# Patient Record
Sex: FEMALE | Race: WHITE | NOT HISPANIC OR LATINO | URBAN - METROPOLITAN AREA
[De-identification: names, ages, dates, MRNs, and addresses within clinical notes are randomized per-mention and may not be internally consistent; named-entity substitution may affect disease eponyms.]

---

## 2022-04-11 ENCOUNTER — OUTPATIENT (OUTPATIENT)
Dept: OUTPATIENT SERVICES | Facility: HOSPITAL | Age: 22
LOS: 1 days | End: 2022-04-11
Payer: COMMERCIAL

## 2022-04-11 VITALS — DIASTOLIC BLOOD PRESSURE: 55 MMHG | HEART RATE: 83 BPM | SYSTOLIC BLOOD PRESSURE: 107 MMHG

## 2022-04-11 VITALS — OXYGEN SATURATION: 99 % | HEART RATE: 100 BPM

## 2022-04-11 DIAGNOSIS — O26.899 OTHER SPECIFIED PREGNANCY RELATED CONDITIONS, UNSPECIFIED TRIMESTER: ICD-10-CM

## 2022-04-11 DIAGNOSIS — Z3A.00 WEEKS OF GESTATION OF PREGNANCY NOT SPECIFIED: ICD-10-CM

## 2022-04-11 PROCEDURE — 99213 OFFICE O/P EST LOW 20 MIN: CPT

## 2022-04-11 PROCEDURE — G0463: CPT

## 2022-04-11 PROCEDURE — 59025 FETAL NON-STRESS TEST: CPT

## 2022-04-11 NOTE — OB RN TRIAGE NOTE - FALL HARM RISK - UNIVERSAL INTERVENTIONS
Bed in lowest position, wheels locked, appropriate side rails in place/Call bell, personal items and telephone in reach/Instruct patient to call for assistance before getting out of bed or chair/Non-slip footwear when patient is out of bed/[place] to call system/Physically safe environment - no spills, clutter or unnecessary equipment/Purposeful Proactive Rounding/Room/bathroom lighting operational, light cord in reach

## 2022-04-11 NOTE — OB PROVIDER TRIAGE NOTE - HISTORY OF PRESENT ILLNESS
Pt is a 20yo G1 @ 38w0d here with ROM, clear at 115a. Pt reports "gush" of fluid while sleeping. Pt. denies further episodes of leaking. Pt. denies vb, ctx. +FM PNC uncomplicated. GBS (-) EFW 2900g    OBHx: 1st preg  GYNHx: denies ovarian cysts, fibroids, hx of abnormal pap or STDs  PMHx: denies  PSurgHx: denies  Allergies: PCN, Cephalosporins: unknown  Meds: PNV  Social: No smoking, alcohol, or illicit drug use  Psych: No hx of anxiety or depression

## 2022-04-11 NOTE — OB PROVIDER TRIAGE NOTE - NSHPPHYSICALEXAM_GEN_ALL_CORE
PE:    T(C): 37.1 (04-11-22 @ 03:40), Max: 37.1 (04-11-22 @ 03:40)  HR: 90 (04-11-22 @ 04:25) (86 - 100)  BP: 117/71 (04-11-22 @ 03:40) (117/71 - 117/71)  RR: 18 (04-11-22 @ 03:40) (18 - 18)  SpO2: 98% (04-11-22 @ 04:25) (97% - 99%)    General: NAD, A&Ox3  CV: RRR  Lungs: Clear bilat   Abd: soft, nontender, gravid  SSE: -pooling, -nitrizine, -ferning  VE: 2/60/-3  Bedside sono: vertex, YANNA 12  EFM: /moderate variablity/+accels/-decels  TOCO:

## 2022-04-11 NOTE — OB PROVIDER TRIAGE NOTE - NSOBPROVIDERNOTE_OBGYN_ALL_OB_FT
A/P: 20yo G1 @ 38w0d here for ROM and found to not be ruptured  -EFM: reactive   -Pt to be discharged to home with labor precautions and reasons to return to hospital       d/w Mick Vincent PA-C

## 2022-04-14 ENCOUNTER — OUTPATIENT (OUTPATIENT)
Dept: OUTPATIENT SERVICES | Facility: HOSPITAL | Age: 22
LOS: 1 days | End: 2022-04-14
Payer: COMMERCIAL

## 2022-04-14 VITALS
OXYGEN SATURATION: 98 % | RESPIRATION RATE: 18 BRPM | HEART RATE: 87 BPM | DIASTOLIC BLOOD PRESSURE: 71 MMHG | TEMPERATURE: 98 F | SYSTOLIC BLOOD PRESSURE: 113 MMHG

## 2022-04-14 DIAGNOSIS — Z3A.00 WEEKS OF GESTATION OF PREGNANCY NOT SPECIFIED: ICD-10-CM

## 2022-04-14 DIAGNOSIS — O26.899 OTHER SPECIFIED PREGNANCY RELATED CONDITIONS, UNSPECIFIED TRIMESTER: ICD-10-CM

## 2022-04-14 LAB
APTT BLD: 30.9 SEC — SIGNIFICANT CHANGE UP (ref 27.5–35.5)
BLD GP AB SCN SERPL QL: NEGATIVE — SIGNIFICANT CHANGE UP
FIBRINOGEN PPP-MCNC: 645 MG/DL — HIGH (ref 330–520)
HCT VFR BLD CALC: 35.6 % — SIGNIFICANT CHANGE UP (ref 34.5–45)
HGB BLD-MCNC: 11.7 G/DL — SIGNIFICANT CHANGE UP (ref 11.5–15.5)
INR BLD: 0.96 RATIO — SIGNIFICANT CHANGE UP (ref 0.88–1.16)
MCHC RBC-ENTMCNC: 27.7 PG — SIGNIFICANT CHANGE UP (ref 27–34)
MCHC RBC-ENTMCNC: 32.9 GM/DL — SIGNIFICANT CHANGE UP (ref 32–36)
MCV RBC AUTO: 84.2 FL — SIGNIFICANT CHANGE UP (ref 80–100)
NRBC # BLD: 0 /100 WBCS — SIGNIFICANT CHANGE UP (ref 0–0)
PLATELET # BLD AUTO: 191 K/UL — SIGNIFICANT CHANGE UP (ref 150–400)
PROTHROM AB SERPL-ACNC: 11 SEC — SIGNIFICANT CHANGE UP (ref 10.5–13.4)
RBC # BLD: 4.23 M/UL — SIGNIFICANT CHANGE UP (ref 3.8–5.2)
RBC # FLD: 14.1 % — SIGNIFICANT CHANGE UP (ref 10.3–14.5)
RH IG SCN BLD-IMP: POSITIVE — SIGNIFICANT CHANGE UP
WBC # BLD: 10.29 K/UL — SIGNIFICANT CHANGE UP (ref 3.8–10.5)
WBC # FLD AUTO: 10.29 K/UL — SIGNIFICANT CHANGE UP (ref 3.8–10.5)

## 2022-04-14 PROCEDURE — G0463: CPT

## 2022-04-14 PROCEDURE — 86900 BLOOD TYPING SEROLOGIC ABO: CPT

## 2022-04-14 PROCEDURE — 36415 COLL VENOUS BLD VENIPUNCTURE: CPT

## 2022-04-14 PROCEDURE — 59025 FETAL NON-STRESS TEST: CPT

## 2022-04-14 PROCEDURE — 86850 RBC ANTIBODY SCREEN: CPT

## 2022-04-14 PROCEDURE — 85730 THROMBOPLASTIN TIME PARTIAL: CPT

## 2022-04-14 PROCEDURE — 85610 PROTHROMBIN TIME: CPT

## 2022-04-14 PROCEDURE — 85384 FIBRINOGEN ACTIVITY: CPT

## 2022-04-14 PROCEDURE — 85027 COMPLETE CBC AUTOMATED: CPT

## 2022-04-14 PROCEDURE — 86901 BLOOD TYPING SEROLOGIC RH(D): CPT

## 2022-04-14 RX ORDER — FAMOTIDINE 10 MG/ML
20 INJECTION INTRAVENOUS ONCE
Refills: 0 | Status: COMPLETED | OUTPATIENT
Start: 2022-04-14 | End: 2022-04-14

## 2022-04-14 RX ORDER — FAMOTIDINE 10 MG/ML
20 INJECTION INTRAVENOUS ONCE
Refills: 0 | Status: DISCONTINUED | OUTPATIENT
Start: 2022-04-14 | End: 2022-04-14

## 2022-04-14 RX ADMIN — FAMOTIDINE 20 MILLIGRAM(S): 10 INJECTION INTRAVENOUS at 23:30
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
## 2022-04-14 NOTE — OB RN TRIAGE NOTE - FALL HARM RISK - UNIVERSAL INTERVENTIONS
Bed in lowest position, wheels locked, appropriate side rails in place/Call bell, personal items and telephone in reach/Instruct patient to call for assistance before getting out of bed or chair/Non-slip footwear when patient is out of bed/[place] to call system/Physically safe environment - no spills, clutter or unnecessary equipment/Purposeful Proactive Rounding/Room/bathroom lighting operational, light cord in reach

## 2022-04-15 VITALS — OXYGEN SATURATION: 94 % | HEART RATE: 86 BPM | DIASTOLIC BLOOD PRESSURE: 73 MMHG | SYSTOLIC BLOOD PRESSURE: 119 MMHG

## 2022-04-19 ENCOUNTER — INPATIENT (INPATIENT)
Facility: HOSPITAL | Age: 22
LOS: 0 days | Discharge: HOME | End: 2022-04-20
Attending: OBSTETRICS & GYNECOLOGY | Admitting: OBSTETRICS & GYNECOLOGY
Payer: COMMERCIAL

## 2022-04-19 VITALS — HEART RATE: 113 BPM | OXYGEN SATURATION: 97 %

## 2022-04-19 DIAGNOSIS — Z3A.00 WEEKS OF GESTATION OF PREGNANCY NOT SPECIFIED: ICD-10-CM

## 2022-04-19 DIAGNOSIS — Z34.80 ENCOUNTER FOR SUPERVISION OF OTHER NORMAL PREGNANCY, UNSPECIFIED TRIMESTER: ICD-10-CM

## 2022-04-19 DIAGNOSIS — O26.899 OTHER SPECIFIED PREGNANCY RELATED CONDITIONS, UNSPECIFIED TRIMESTER: ICD-10-CM

## 2022-04-19 LAB
BASOPHILS # BLD AUTO: 0.03 K/UL — SIGNIFICANT CHANGE UP (ref 0–0.2)
BASOPHILS NFR BLD AUTO: 0.2 % — SIGNIFICANT CHANGE UP (ref 0–2)
BLD GP AB SCN SERPL QL: NEGATIVE — SIGNIFICANT CHANGE UP
COVID-19 SPIKE DOMAIN AB INTERP: POSITIVE
COVID-19 SPIKE DOMAIN ANTIBODY RESULT: >250 U/ML — HIGH
EOSINOPHIL # BLD AUTO: 0.04 K/UL — SIGNIFICANT CHANGE UP (ref 0–0.5)
EOSINOPHIL NFR BLD AUTO: 0.3 % — SIGNIFICANT CHANGE UP (ref 0–6)
HCT VFR BLD CALC: 39.7 % — SIGNIFICANT CHANGE UP (ref 34.5–45)
HGB BLD-MCNC: 12.9 G/DL — SIGNIFICANT CHANGE UP (ref 11.5–15.5)
IMM GRANULOCYTES NFR BLD AUTO: 0.5 % — SIGNIFICANT CHANGE UP (ref 0–1.5)
LYMPHOCYTES # BLD AUTO: 1.32 K/UL — SIGNIFICANT CHANGE UP (ref 1–3.3)
LYMPHOCYTES # BLD AUTO: 9 % — LOW (ref 13–44)
MCHC RBC-ENTMCNC: 26.7 PG — LOW (ref 27–34)
MCHC RBC-ENTMCNC: 32.5 GM/DL — SIGNIFICANT CHANGE UP (ref 32–36)
MCV RBC AUTO: 82.2 FL — SIGNIFICANT CHANGE UP (ref 80–100)
MONOCYTES # BLD AUTO: 0.8 K/UL — SIGNIFICANT CHANGE UP (ref 0–0.9)
MONOCYTES NFR BLD AUTO: 5.5 % — SIGNIFICANT CHANGE UP (ref 2–14)
NEUTROPHILS # BLD AUTO: 12.39 K/UL — HIGH (ref 1.8–7.4)
NEUTROPHILS NFR BLD AUTO: 84.5 % — HIGH (ref 43–77)
NRBC # BLD: 0 /100 WBCS — SIGNIFICANT CHANGE UP (ref 0–0)
PLATELET # BLD AUTO: 223 K/UL — SIGNIFICANT CHANGE UP (ref 150–400)
RBC # BLD: 4.83 M/UL — SIGNIFICANT CHANGE UP (ref 3.8–5.2)
RBC # FLD: 14.4 % — SIGNIFICANT CHANGE UP (ref 10.3–14.5)
RH IG SCN BLD-IMP: POSITIVE — SIGNIFICANT CHANGE UP
RUBV IGG SER-ACNC: 0.7 INDEX — SIGNIFICANT CHANGE UP
RUBV IGG SER-IMP: NEGATIVE — SIGNIFICANT CHANGE UP
SARS-COV-2 IGG+IGM SERPL QL IA: >250 U/ML — HIGH
SARS-COV-2 IGG+IGM SERPL QL IA: POSITIVE
SARS-COV-2 RNA SPEC QL NAA+PROBE: SIGNIFICANT CHANGE UP
T PALLIDUM AB TITR SER: NEGATIVE — SIGNIFICANT CHANGE UP
WBC # BLD: 14.65 K/UL — HIGH (ref 3.8–10.5)
WBC # FLD AUTO: 14.65 K/UL — HIGH (ref 3.8–10.5)

## 2022-04-19 RX ORDER — CITRIC ACID/SODIUM CITRATE 300-500 MG
15 SOLUTION, ORAL ORAL EVERY 6 HOURS
Refills: 0 | Status: DISCONTINUED | OUTPATIENT
Start: 2022-04-19 | End: 2022-04-19

## 2022-04-19 RX ORDER — OXYTOCIN 10 UNIT/ML
333.33 VIAL (ML) INJECTION
Qty: 20 | Refills: 0 | Status: DISCONTINUED | OUTPATIENT
Start: 2022-04-19 | End: 2022-04-20

## 2022-04-19 RX ORDER — KETOROLAC TROMETHAMINE 30 MG/ML
30 SYRINGE (ML) INJECTION ONCE
Refills: 0 | Status: DISCONTINUED | OUTPATIENT
Start: 2022-04-19 | End: 2022-04-19

## 2022-04-19 RX ORDER — MAGNESIUM HYDROXIDE 400 MG/1
30 TABLET, CHEWABLE ORAL
Refills: 0 | Status: DISCONTINUED | OUTPATIENT
Start: 2022-04-19 | End: 2022-04-20

## 2022-04-19 RX ORDER — SODIUM CHLORIDE 9 MG/ML
3 INJECTION INTRAMUSCULAR; INTRAVENOUS; SUBCUTANEOUS EVERY 8 HOURS
Refills: 0 | Status: DISCONTINUED | OUTPATIENT
Start: 2022-04-19 | End: 2022-04-20

## 2022-04-19 RX ORDER — DIPHENHYDRAMINE HCL 50 MG
25 CAPSULE ORAL EVERY 6 HOURS
Refills: 0 | Status: COMPLETED | OUTPATIENT
Start: 2022-04-19 | End: 2023-03-18

## 2022-04-19 RX ORDER — OXYCODONE HYDROCHLORIDE 5 MG/1
5 TABLET ORAL ONCE
Refills: 0 | Status: DISCONTINUED | OUTPATIENT
Start: 2022-04-19 | End: 2022-04-20

## 2022-04-19 RX ORDER — BENZOCAINE 10 %
1 GEL (GRAM) MUCOUS MEMBRANE EVERY 6 HOURS
Refills: 0 | Status: DISCONTINUED | OUTPATIENT
Start: 2022-04-19 | End: 2022-04-20

## 2022-04-19 RX ORDER — IBUPROFEN 200 MG
600 TABLET ORAL EVERY 6 HOURS
Refills: 0 | Status: COMPLETED | OUTPATIENT
Start: 2022-04-19 | End: 2023-03-18

## 2022-04-19 RX ORDER — IBUPROFEN 200 MG
600 TABLET ORAL EVERY 6 HOURS
Refills: 0 | Status: DISCONTINUED | OUTPATIENT
Start: 2022-04-19 | End: 2022-04-20

## 2022-04-19 RX ORDER — TETANUS TOXOID, REDUCED DIPHTHERIA TOXOID AND ACELLULAR PERTUSSIS VACCINE, ADSORBED 5; 2.5; 8; 8; 2.5 [IU]/.5ML; [IU]/.5ML; UG/.5ML; UG/.5ML; UG/.5ML
0.5 SUSPENSION INTRAMUSCULAR ONCE
Refills: 0 | Status: DISCONTINUED | OUTPATIENT
Start: 2022-04-19 | End: 2022-04-20

## 2022-04-19 RX ORDER — DIPHENHYDRAMINE HCL 50 MG
25 CAPSULE ORAL EVERY 6 HOURS
Refills: 0 | Status: DISCONTINUED | OUTPATIENT
Start: 2022-04-19 | End: 2022-04-20

## 2022-04-19 RX ORDER — ACETAMINOPHEN 500 MG
975 TABLET ORAL
Refills: 0 | Status: DISCONTINUED | OUTPATIENT
Start: 2022-04-19 | End: 2022-04-20

## 2022-04-19 RX ORDER — PRAMOXINE HYDROCHLORIDE 150 MG/15G
1 AEROSOL, FOAM RECTAL EVERY 4 HOURS
Refills: 0 | Status: DISCONTINUED | OUTPATIENT
Start: 2022-04-19 | End: 2022-04-20

## 2022-04-19 RX ORDER — AER TRAVELER 0.5 G/1
1 SOLUTION RECTAL; TOPICAL EVERY 4 HOURS
Refills: 0 | Status: DISCONTINUED | OUTPATIENT
Start: 2022-04-19 | End: 2022-04-20

## 2022-04-19 RX ORDER — OXYTOCIN 10 UNIT/ML
333.33 VIAL (ML) INJECTION
Qty: 20 | Refills: 0 | Status: DISCONTINUED | OUTPATIENT
Start: 2022-04-19 | End: 2022-04-19

## 2022-04-19 RX ORDER — SODIUM CHLORIDE 9 MG/ML
1000 INJECTION, SOLUTION INTRAVENOUS
Refills: 0 | Status: DISCONTINUED | OUTPATIENT
Start: 2022-04-19 | End: 2022-04-19

## 2022-04-19 RX ORDER — DIBUCAINE 1 %
1 OINTMENT (GRAM) RECTAL EVERY 6 HOURS
Refills: 0 | Status: DISCONTINUED | OUTPATIENT
Start: 2022-04-19 | End: 2022-04-20

## 2022-04-19 RX ORDER — LANOLIN
1 OINTMENT (GRAM) TOPICAL EVERY 6 HOURS
Refills: 0 | Status: DISCONTINUED | OUTPATIENT
Start: 2022-04-19 | End: 2022-04-20

## 2022-04-19 RX ORDER — OXYCODONE HYDROCHLORIDE 5 MG/1
5 TABLET ORAL
Refills: 0 | Status: DISCONTINUED | OUTPATIENT
Start: 2022-04-19 | End: 2022-04-20

## 2022-04-19 RX ORDER — SIMETHICONE 80 MG/1
80 TABLET, CHEWABLE ORAL EVERY 4 HOURS
Refills: 0 | Status: DISCONTINUED | OUTPATIENT
Start: 2022-04-19 | End: 2022-04-20

## 2022-04-19 RX ORDER — HYDROCORTISONE 1 %
1 OINTMENT (GRAM) TOPICAL EVERY 6 HOURS
Refills: 0 | Status: DISCONTINUED | OUTPATIENT
Start: 2022-04-19 | End: 2022-04-20

## 2022-04-19 RX ADMIN — Medication 975 MILLIGRAM(S): at 21:22

## 2022-04-19 RX ADMIN — Medication 30 MILLIGRAM(S): at 18:15

## 2022-04-19 RX ADMIN — Medication 975 MILLIGRAM(S): at 21:52

## 2022-04-19 RX ADMIN — Medication 30 MILLIGRAM(S): at 18:34

## 2022-04-19 RX ADMIN — SODIUM CHLORIDE 3 MILLILITER(S): 9 INJECTION INTRAMUSCULAR; INTRAVENOUS; SUBCUTANEOUS at 22:00

## 2022-04-19 RX ADMIN — Medication 600 MILLIGRAM(S): at 23:41

## 2022-04-19 RX ADMIN — SODIUM CHLORIDE 125 MILLILITER(S): 9 INJECTION, SOLUTION INTRAVENOUS at 11:00

## 2022-04-19 NOTE — OB PROVIDER H&P - ASSESSMENT
20yo  EDC 22 @ 39w1d in labor, GBS negative, category 1 FHT  admit  efm/toco  ivf  routine labs/Covid swab  anticipate   anesthesia consult, pt requesting epidural  D/W Dr Chris Cardenas PAC

## 2022-04-19 NOTE — OB PROVIDER H&P - NSHPPHYSICALEXAM_GEN_ALL_CORE
T(C): --  HR: 93 (04-19-22 @ 11:01) (93 - 113)  BP: --  RR: --  SpO2: 98% (04-19-22 @ 11:01) (97% - 98%)  GEN:NAD  CV:RRR  Pulm: CTA bl  Abd soft NT gravid  FHT:   125  , mod cosme, + accels, - decels   TOCO:  q3m  VE: 5/100/-1  SONO vtx

## 2022-04-19 NOTE — PRE-ANESTHESIA EVALUATION ADULT - NSANTHPMHFT_GEN_ALL_CORE
20yo  EDC 22 @ 39w1d sent from the office where she was 4cm dilated. Pt reports ctx since 330am. Denies LOF/VB. +FM  GBS  negative  EFW 3000  PNC uncomplicated

## 2022-04-19 NOTE — OB PROVIDER LABOR PROGRESS NOTE - ASSESSMENT
bulging membranes    will transfer to labor room  arom at that time    Linnea Perez  Ob attg
cont pushing    Linnea Perez

## 2022-04-19 NOTE — OB RN DELIVERY SUMMARY - NSSELHIDDEN_OBGYN_ALL_OB_FT
[NS_DeliveryAttending1_OBGYN_ALL_OB_FT:[id number]],[NS_DeliveryRN_OBGYN_ALL_OB_FT:[id number]]

## 2022-04-19 NOTE — PRE-ANESTHESIA EVALUATION ADULT - NSANTHOSAYNRD_GEN_A_CORE
No. SHAHBAZ screening performed.  STOP BANG Legend: 0-2 = LOW Risk; 3-4 = INTERMEDIATE Risk; 5-8 = HIGH Risk

## 2022-04-19 NOTE — OB PROVIDER H&P - HISTORY OF PRESENT ILLNESS
20yo  EDC 22 @ 39w1d sent from the office where she was 4cm dilated. Pt reports ctx since 330am. Denies LOF/VB. +FM  GBS  negative  EFW 3000  PNC uncomplicated  PNL:    OB:  4w sab  GYN:Denies fibroids/ovarian cysts/STI's/abnl pap  PMH: none  PSH: none  MEDS:  ALL: amoxicillin, unknown reaction; cephalosporins, unknown reaction  Accepts blood. Denies h/o anxiety/depression.

## 2022-04-19 NOTE — OB RN DELIVERY SUMMARY - BABY A: APGAR 5 MIN REFLEX IRRITABILITY, DELIVERY
----- Message from Doris Mills NP sent at 3/16/2020  2:44 PM CDT -----  Patient has influenza A.  She is past the window for treatment. 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  Longer if febrile.     She can hold off on antibiotic at this point and only start if cough worsens.   (2) cough or sneeze

## 2022-04-19 NOTE — OB RN DELIVERY SUMMARY - NS_SEPSISRSKCALC_OBGYN_ALL_OB_FT
GBS status in the 'Prenatal Lab tests/results section' on the OB RN Patient Profile must be documented.   EOS calculated successfully. EOS Risk Factor: 0.5/1000 live births ([facility] national incidence); GA=39w1d; Temp=98.24; ROM=1.7; GBS='Negative'; Antibiotics='No antibiotics or any antibiotics < 2 hrs prior to birth'

## 2022-04-19 NOTE — OB PROVIDER DELIVERY SUMMARY - NSPROVIDERDELIVERYNOTE_OBGYN_ALL_OB_FT
Patient presented in labor.  Progressed to FD.  AROM and progressed to FD.  Pushed to undergo .  Baby boy, 9,9 apgars.  Second degree laceration and vaginal laceration repaired with 2.0 and 3.0 vicryl rapide.  Sphincter intact.  Stable to pacu.  Baby to well baby.     Linnea Perez  Ob attg

## 2022-04-19 NOTE — OB PROVIDER H&P - ATTENDING COMMENTS
20yo  EDC 22 @ 39w1d in labor, GBS negative, category 1 FHT    admit  labs  consents  efm/toco  ivh/clears  epidural prn    Linnea Perez  Ob attg

## 2022-04-20 ENCOUNTER — TRANSCRIPTION ENCOUNTER (OUTPATIENT)
Age: 22
End: 2022-04-20

## 2022-04-20 VITALS
SYSTOLIC BLOOD PRESSURE: 96 MMHG | OXYGEN SATURATION: 97 % | RESPIRATION RATE: 18 BRPM | DIASTOLIC BLOOD PRESSURE: 69 MMHG | HEART RATE: 79 BPM | TEMPERATURE: 98 F

## 2022-04-20 PROCEDURE — 59025 FETAL NON-STRESS TEST: CPT

## 2022-04-20 PROCEDURE — 59050 FETAL MONITOR W/REPORT: CPT

## 2022-04-20 PROCEDURE — 36415 COLL VENOUS BLD VENIPUNCTURE: CPT

## 2022-04-20 PROCEDURE — 86769 SARS-COV-2 COVID-19 ANTIBODY: CPT

## 2022-04-20 PROCEDURE — 86762 RUBELLA ANTIBODY: CPT

## 2022-04-20 PROCEDURE — 86901 BLOOD TYPING SEROLOGIC RH(D): CPT

## 2022-04-20 PROCEDURE — 86780 TREPONEMA PALLIDUM: CPT

## 2022-04-20 PROCEDURE — 86900 BLOOD TYPING SEROLOGIC ABO: CPT

## 2022-04-20 PROCEDURE — 86850 RBC ANTIBODY SCREEN: CPT

## 2022-04-20 PROCEDURE — 85025 COMPLETE CBC W/AUTO DIFF WBC: CPT

## 2022-04-20 PROCEDURE — G0463: CPT

## 2022-04-20 PROCEDURE — 87635 SARS-COV-2 COVID-19 AMP PRB: CPT

## 2022-04-20 RX ADMIN — Medication 600 MILLIGRAM(S): at 05:49

## 2022-04-20 RX ADMIN — Medication 975 MILLIGRAM(S): at 09:24

## 2022-04-20 RX ADMIN — Medication 975 MILLIGRAM(S): at 14:35

## 2022-04-20 RX ADMIN — Medication 975 MILLIGRAM(S): at 15:04

## 2022-04-20 RX ADMIN — Medication 975 MILLIGRAM(S): at 02:34

## 2022-04-20 RX ADMIN — Medication 600 MILLIGRAM(S): at 12:20

## 2022-04-20 RX ADMIN — Medication 1 TABLET(S): at 12:21

## 2022-04-20 RX ADMIN — Medication 600 MILLIGRAM(S): at 00:11

## 2022-04-20 RX ADMIN — Medication 975 MILLIGRAM(S): at 09:54

## 2022-04-20 RX ADMIN — Medication 600 MILLIGRAM(S): at 18:53

## 2022-04-20 RX ADMIN — Medication 600 MILLIGRAM(S): at 18:36

## 2022-04-20 RX ADMIN — Medication 600 MILLIGRAM(S): at 12:50

## 2022-04-20 RX ADMIN — Medication 600 MILLIGRAM(S): at 05:19

## 2022-04-20 RX ADMIN — Medication 975 MILLIGRAM(S): at 03:04

## 2022-04-20 NOTE — DISCHARGE NOTE OB - PATIENT PORTAL LINK FT
You can access the FollowMyHealth Patient Portal offered by [facility] by registering at the following website: [email or web address]. By joining "[employer]’s FollowMyHealth portal, you will also be able to view your health information using other applications (apps) compatible with our system.

## 2022-04-20 NOTE — DISCHARGE NOTE OB - NS MD DC FALL RISK RISK
For information on Fall & Injury Prevention, visit: [email or web address] OR  [email or web address] OR  https://www.cdc.gov/steadi/patient.html

## 2022-04-20 NOTE — DISCHARGE NOTE OB - CARE PROVIDER_API CALL
Kleber Nguyen)  Obstetrics and Gynecology  [address] [place] [address], [address]  [place] [address]  Phone: [phone]  Fax: [phone]  Follow Up Time:

## 2022-04-20 NOTE — PROGRESS NOTE ADULT - ASSESSMENT
A/P: 20yo PPD#1 s/p .  Patient is stable and doing well post-partum.   - Pain well controlled, continue current pain regimen  - Increase ambulation, SCDs when not ambulating  - Continue regular diet    GERI Vega PGY-1

## 2022-04-20 NOTE — DISCHARGE NOTE OB - BREAST MILK IS MORE DIGESTIBLE, MAKING VOMITING, DIARRHEA, GAS AND CONSTIPATION LESS COMMON
Statement Selected Purse String (Simple) Text: Given the location of the defect and the characteristics of the surrounding skin a purse string simple closure was deemed most appropriate.  Undermining was performed circumferentially around the surgical defect.  A purse string suture was then placed and tightened.

## 2022-04-20 NOTE — PROGRESS NOTE ADULT - SUBJECTIVE AND OBJECTIVE BOX
OB Progress Note:  PPD#1    S: 22yo PPD#1 s/p . Patient feels well. Pain is well controlled. She is tolerating a regular diet and passing flatus. She is voiding spontaneously, and ambulating without difficulty. Denies CP/SOB. Denies HA/lightheadedness/dizziness. Denies N/V. Denies calf pain.    O:  Vitals:  Vital Signs Last 24 Hrs  T(C): 37 (2022 00:53), Max: 37 (2022 00:53)  T(F): 98.6 (2022 00:53), Max: 98.6 (2022 00:53)  HR: 80 (2022 00:53) (80 - 127)  BP: 100/63 (2022 00:53) (95/54 - 137/83)  BP(mean): --  RR: 18 (2022 00:53) (14 - 18)  SpO2: 97% (2022 00:53) (85% - 100%)    MEDICATIONS  (STANDING):  acetaminophen     Tablet .. 975 milliGRAM(s) Oral <User Schedule>  diphtheria/tetanus/pertussis (acellular) Vaccine (ADAcel) 0.5 milliLiter(s) IntraMuscular once  ibuprofen  Tablet. 600 milliGRAM(s) Oral every 6 hours  oxytocin Infusion 333.333 milliUNIT(s)/Min (1000 mL/Hr) IV Continuous <Continuous>  prenatal multivitamin 1 Tablet(s) Oral daily  sodium chloride 0.9% lock flush 3 milliLiter(s) IV Push every 8 hours      Labs:  Blood type: A Positive  Rubella IgG: RPR: Negative                          12.9   14.65<H> >-----------< 223    ( 04-19 @ 11:51 )             39.7                  Physical Exam:  General: NAD  Abdomen: soft, non-tender, non-distended, fundus firm  Vaginal: lochia wnl, exam deferred  Extremities: no erythema/calf tenderness

## 2022-05-05 NOTE — OB RN TRIAGE NOTE - HOW OFTEN DO YOU HAVE A DRINK CONTAINING ALCOHOL?
This note was copied from a baby's chart.  BREASTFEEDING SUPPORT SERVICES NOTE    Time spent with mother/baby: 5 minutes for breasfeeding observation.    This IBCLC arrived to the bedside and infant already at the breast.  Offered breastfeeding assistance/assessment and Mom accepted  Mom held baby in a cradle hold to her right breast.  Deep latch observed. Rocker jaw movements noted and swallows heard. Mom reports some pinching when she first latched Nick, but was able to reposition and get him latched deeper and her comfort increased.     During this breastfeeding session, we discussed the following:  Signs of Good Positioning and Latch:   • Baby is turned with tummy to mom’s tummy  • Baby is deeply latched taking ½-1” of areola in  • Infants lips are flanged, chin is touching breast  • Baby’s ear and temple are moving with suckling  • Long bursts of suckling & swallowing is heard  • Breasts are softer after feeding  • Infant is satisfied after feeding    The following services and/or education has been provided:   How to know breastfeeding is going well at home.  Importance of Skin to Skin contact.  Discussed cluster feedings.  Delay of bottles and pacifiers while infant learning at breast.  Encouraged offering both breasts every 2-3 hours with cues.    Mom/family was encouraged to keep a feeding diary on new breastfeeding baby for the first two weeks or until baby is back to birthweight.  Feeding log can be discontinued once health care provider is reassured that feedings are going well and that baby's weight gain pattern is adequate.    Follow up appointment with pediatrician in 1-2 days for weight check.     Aware that they can have LC paged up to time of discharge for LC assistance.     Mom verbalized understanding and had no further needs, questions or concerns at completion of visit at the bed side.     Lactation will continue to follow.     Never

## 2023-01-25 NOTE — OB RN DELIVERY SUMMARY - BABYS CARE PROVIDER NAME, OB PROFILE
[facility]  [address] [facility] [address] [phone]  Dept: [phone]    PRE-OP EVALUATION:  Lois T Behrendt is a 23 month old female, here for a pre-operative evaluation, accompanied by her mother    Today's date: 1/25/2023  This report is available electronically  Primary Physician: Citlaly Treadwell   Type of Anesthesia Anticipated: General    PRE-OP PEDIATRIC QUESTIONS 1/24/2023   What procedure is being done? Ear tubes and adenoid removal   Date of surgery / procedure: 1/31/23   Facility or Hospital where procedure/surgery will be performed: [facility]   1.  In the last week, has your child had any illness, including a cold, cough, shortness of breath or wheezing? No   2.  In the last week, has your child used ibuprofen or aspirin? No   3.  Does your child use herbal medications?  No   5.  Has your child ever had wheezing or asthma? No   6. Does your child use supplemental oxygen or a C-PAP Machine? No   7.  Has your child ever had anesthesia or been put under for a procedure? No   8.  Has your child or anyone in your family ever had problems with anesthesia? No   9.  Does your child or anyone in your family have a serious bleeding problem or easy bruising? No   10. Has your child ever had a blood transfusion?  No   11. Does your child have an implanted device (for example: cochlear implant, pacemaker,  shunt)? No           HPI:     Brief HPI related to upcoming procedure: Rafaela is a generally healthy 23 month old here for a pre-op prior to ENT procedure for ear tubes and adenoidectomy. She has a history of EMILY and concern for conductive hearing loss along with chronic mouth breathing. She's been healthy over the past week; no fever, cough, vomiting or other concerns.     She has Augmentin listed as an allergy as she had a rash after taking this medication. She's not currently on any medication.    She's never had anesthesia in the past. No family  history of untoward reactions to anesthesia.    Medical History:     PROBLEM LIST  Patient Active Problem List    Diagnosis Date Noted     Mouth breathing 01/17/2023     Priority: Medium     Added automatically from request for surgery [id number]       Left acute suppurative otitis media 01/17/2023     Priority: Medium     Added automatically from request for surgery [id number]       Spontaneous rupture of tympanic membrane of right ear concurrent with and due to acute suppurative otitis media 01/17/2023     Priority: Medium     Added automatically from request for surgery [id number]       EMILY (middle ear effusion), bilateral 01/17/2023     Priority: Medium     Added automatically from request for surgery [id number]       Cough, unspecified type 12/13/2022     Priority: Medium       SURGICAL HISTORY  No past surgical history on file.    MEDICATIONS  No current outpatient medications on file prior to visit.  No current facility-administered medications on file prior to visit.      ALLERGIES  Allergies   Allergen Reactions     Augmentin Es-600 Rash        Review of Systems:   Constitutional, eye, ENT, skin, respiratory, cardiac, GI, MSK, neuro, and allergy are normal except as otherwise noted.      Physical Exam:     Pulse 115   Temp 98  F (36.7  C) (Axillary)   Wt 28 lb 12.8 oz (13.1 kg)   SpO2 99%   85 %ile (Z= 1.05) based on WHO (Girls, 0-2 years) weight-for-age data using vitals from 1/25/2023.  No blood pressure reading on file for this encounter.  GENERAL: Active, alert, in no acute distress.  SKIN: Clear. No significant rash, abnormal pigmentation or lesions  HEAD: Normocephalic.  EYES:  No discharge or erythema. Normal pupils and EOM.  BOTH EARS: clear effusion and erythematous  NOSE: congested  MOUTH/THROAT: Clear. No oral lesions. Teeth intact without obvious abnormalities.  NECK: Supple, no masses.  LYMPH NODES: No adenopathy  LUNGS: Clear. No rales, rhonchi, wheezing or retractions  HEART: Regular rhythm. Normal  S1/S2. No murmurs.  ABDOMEN: Soft, non-tender, not distended, no masses or hepatosplenomegaly. Bowel sounds normal.       Diagnostics:   None indicated     Assessment/Plan:   Lois T Behrendt is a 23 month old female, presenting for:  1. Pre-op exam    2. Chronic otitis media of both ears with effusion    3. Mouth breathing        Airway/Pulmonary Risk: None identified  Cardiac Risk: None identified  Hematology/Coagulation Risk: None identified  Metabolic Risk: None identified  Pain/Comfort Risk: None identified     Approval given to proceed with proposed procedure, without further diagnostic evaluation    Copy of this evaluation report is provided to requesting physician.    ____________________________________  January 25, 2023      Signed Electronically by: Hallie Badillo MD    [address] [address]  Phone: [phone]  Fax: [phone]   Stacey

## 2023-11-28 ENCOUNTER — APPOINTMENT (OUTPATIENT)
Dept: OBGYN | Facility: CLINIC | Age: 23
End: 2023-11-28
Payer: COMMERCIAL

## 2023-11-28 PROCEDURE — 99214 OFFICE O/P EST MOD 30 MIN: CPT

## 2024-01-29 ENCOUNTER — APPOINTMENT (OUTPATIENT)
Dept: OBGYN | Facility: CLINIC | Age: 24
End: 2024-01-29
Payer: COMMERCIAL

## 2024-01-29 PROCEDURE — 99213 OFFICE O/P EST LOW 20 MIN: CPT | Mod: 25

## 2024-01-29 PROCEDURE — 76819 FETAL BIOPHYS PROFIL W/O NST: CPT | Mod: 59

## 2024-01-29 PROCEDURE — 0502F SUBSEQUENT PRENATAL CARE: CPT

## 2024-01-29 PROCEDURE — 76816 OB US FOLLOW-UP PER FETUS: CPT

## 2024-02-15 ENCOUNTER — APPOINTMENT (OUTPATIENT)
Dept: OBGYN | Facility: CLINIC | Age: 24
End: 2024-02-15
Payer: COMMERCIAL

## 2024-02-15 PROCEDURE — 0502F SUBSEQUENT PRENATAL CARE: CPT

## 2024-02-15 PROCEDURE — 99213 OFFICE O/P EST LOW 20 MIN: CPT

## 2024-02-22 ENCOUNTER — APPOINTMENT (OUTPATIENT)
Dept: OBGYN | Facility: CLINIC | Age: 24
End: 2024-02-22
Payer: COMMERCIAL

## 2024-02-22 PROCEDURE — 0502F SUBSEQUENT PRENATAL CARE: CPT

## 2024-02-22 PROCEDURE — 99213 OFFICE O/P EST LOW 20 MIN: CPT

## 2024-02-26 ENCOUNTER — TRANSCRIPTION ENCOUNTER (OUTPATIENT)
Age: 24
End: 2024-02-26

## 2024-02-29 ENCOUNTER — INPATIENT (INPATIENT)
Facility: HOSPITAL | Age: 24
LOS: 0 days | Discharge: HOME | End: 2024-03-01
Attending: OBSTETRICS & GYNECOLOGY | Admitting: OBSTETRICS & GYNECOLOGY
Payer: COMMERCIAL

## 2024-02-29 ENCOUNTER — APPOINTMENT (OUTPATIENT)
Dept: OBGYN | Facility: CLINIC | Age: 24
End: 2024-02-29

## 2024-02-29 VITALS — TEMPERATURE: 98 F

## 2024-02-29 DIAGNOSIS — O26.899 OTHER SPECIFIED PREGNANCY RELATED CONDITIONS, UNSPECIFIED TRIMESTER: ICD-10-CM

## 2024-02-29 DIAGNOSIS — Z3A.40 40 WEEKS GESTATION OF PREGNANCY: ICD-10-CM

## 2024-02-29 DIAGNOSIS — Z34.80 ENCOUNTER FOR SUPERVISION OF OTHER NORMAL PREGNANCY, UNSPECIFIED TRIMESTER: ICD-10-CM

## 2024-02-29 LAB
BASOPHILS # BLD AUTO: 0.04 K/UL — SIGNIFICANT CHANGE UP (ref 0–0.2)
BASOPHILS NFR BLD AUTO: 0.3 % — SIGNIFICANT CHANGE UP (ref 0–2)
BLD GP AB SCN SERPL QL: NEGATIVE — SIGNIFICANT CHANGE UP
EOSINOPHIL # BLD AUTO: 0.16 K/UL — SIGNIFICANT CHANGE UP (ref 0–0.5)
EOSINOPHIL NFR BLD AUTO: 1.1 % — SIGNIFICANT CHANGE UP (ref 0–6)
HCT VFR BLD CALC: 38.5 % — SIGNIFICANT CHANGE UP (ref 34.5–45)
HGB BLD-MCNC: 12.4 G/DL — SIGNIFICANT CHANGE UP (ref 11.5–15.5)
IMM GRANULOCYTES NFR BLD AUTO: 0.4 % — SIGNIFICANT CHANGE UP (ref 0–0.9)
LYMPHOCYTES # BLD AUTO: 31.2 % — SIGNIFICANT CHANGE UP (ref 13–44)
LYMPHOCYTES # BLD AUTO: 4.75 K/UL — HIGH (ref 1–3.3)
MCHC RBC-ENTMCNC: 27.4 PG — SIGNIFICANT CHANGE UP (ref 27–34)
MCHC RBC-ENTMCNC: 32.2 GM/DL — SIGNIFICANT CHANGE UP (ref 32–36)
MCV RBC AUTO: 85 FL — SIGNIFICANT CHANGE UP (ref 80–100)
MONOCYTES # BLD AUTO: 0.97 K/UL — HIGH (ref 0–0.9)
MONOCYTES NFR BLD AUTO: 6.4 % — SIGNIFICANT CHANGE UP (ref 2–14)
NEUTROPHILS # BLD AUTO: 9.23 K/UL — HIGH (ref 1.8–7.4)
NEUTROPHILS NFR BLD AUTO: 60.6 % — SIGNIFICANT CHANGE UP (ref 43–77)
NRBC # BLD: 0 /100 WBCS — SIGNIFICANT CHANGE UP (ref 0–0)
PLATELET # BLD AUTO: 188 K/UL — SIGNIFICANT CHANGE UP (ref 150–400)
RBC # BLD: 4.53 M/UL — SIGNIFICANT CHANGE UP (ref 3.8–5.2)
RBC # FLD: 14.2 % — SIGNIFICANT CHANGE UP (ref 10.3–14.5)
RH IG SCN BLD-IMP: POSITIVE — SIGNIFICANT CHANGE UP
T PALLIDUM AB TITR SER: NEGATIVE — SIGNIFICANT CHANGE UP
WBC # BLD: 15.21 K/UL — HIGH (ref 3.8–10.5)
WBC # FLD AUTO: 15.21 K/UL — HIGH (ref 3.8–10.5)

## 2024-02-29 PROCEDURE — 59409 OBSTETRICAL CARE: CPT

## 2024-02-29 RX ORDER — SIMETHICONE 80 MG/1
80 TABLET, CHEWABLE ORAL EVERY 4 HOURS
Refills: 0 | Status: DISCONTINUED | OUTPATIENT
Start: 2024-02-29 | End: 2024-03-01

## 2024-02-29 RX ORDER — TETANUS TOXOID, REDUCED DIPHTHERIA TOXOID AND ACELLULAR PERTUSSIS VACCINE, ADSORBED 5; 2.5; 8; 8; 2.5 [IU]/.5ML; [IU]/.5ML; UG/.5ML; UG/.5ML; UG/.5ML
0.5 SUSPENSION INTRAMUSCULAR ONCE
Refills: 0 | Status: DISCONTINUED | OUTPATIENT
Start: 2024-02-29 | End: 2024-03-01

## 2024-02-29 RX ORDER — IBUPROFEN 200 MG
600 TABLET ORAL EVERY 6 HOURS
Refills: 0 | Status: COMPLETED | OUTPATIENT
Start: 2024-02-29 | End: 2025-01-27

## 2024-02-29 RX ORDER — AER TRAVELER 0.5 G/1
1 SOLUTION RECTAL; TOPICAL EVERY 4 HOURS
Refills: 0 | Status: DISCONTINUED | OUTPATIENT
Start: 2024-02-29 | End: 2024-03-01

## 2024-02-29 RX ORDER — OXYCODONE HYDROCHLORIDE 5 MG/1
5 TABLET ORAL
Refills: 0 | Status: DISCONTINUED | OUTPATIENT
Start: 2024-02-29 | End: 2024-03-01

## 2024-02-29 RX ORDER — SODIUM CHLORIDE 9 MG/ML
1000 INJECTION, SOLUTION INTRAVENOUS
Refills: 0 | Status: DISCONTINUED | OUTPATIENT
Start: 2024-02-29 | End: 2024-02-29

## 2024-02-29 RX ORDER — OXYTOCIN 10 UNIT/ML
333.33 VIAL (ML) INJECTION
Qty: 20 | Refills: 0 | Status: DISCONTINUED | OUTPATIENT
Start: 2024-02-29 | End: 2024-02-29

## 2024-02-29 RX ORDER — ACETAMINOPHEN 500 MG
975 TABLET ORAL
Refills: 0 | Status: DISCONTINUED | OUTPATIENT
Start: 2024-02-29 | End: 2024-03-01

## 2024-02-29 RX ORDER — SODIUM CHLORIDE 9 MG/ML
3 INJECTION INTRAMUSCULAR; INTRAVENOUS; SUBCUTANEOUS EVERY 8 HOURS
Refills: 0 | Status: DISCONTINUED | OUTPATIENT
Start: 2024-02-29 | End: 2024-03-01

## 2024-02-29 RX ORDER — LANOLIN
1 OINTMENT (GRAM) TOPICAL EVERY 6 HOURS
Refills: 0 | Status: DISCONTINUED | OUTPATIENT
Start: 2024-02-29 | End: 2024-03-01

## 2024-02-29 RX ORDER — PRAMOXINE HYDROCHLORIDE 150 MG/15G
1 AEROSOL, FOAM RECTAL EVERY 4 HOURS
Refills: 0 | Status: DISCONTINUED | OUTPATIENT
Start: 2024-02-29 | End: 2024-03-01

## 2024-02-29 RX ORDER — KETOROLAC TROMETHAMINE 30 MG/ML
30 SYRINGE (ML) INJECTION ONCE
Refills: 0 | Status: DISCONTINUED | OUTPATIENT
Start: 2024-02-29 | End: 2024-02-29

## 2024-02-29 RX ORDER — HYDROCORTISONE 1 %
1 OINTMENT (GRAM) TOPICAL EVERY 6 HOURS
Refills: 0 | Status: DISCONTINUED | OUTPATIENT
Start: 2024-02-29 | End: 2024-03-01

## 2024-02-29 RX ORDER — OXYCODONE HYDROCHLORIDE 5 MG/1
5 TABLET ORAL ONCE
Refills: 0 | Status: DISCONTINUED | OUTPATIENT
Start: 2024-02-29 | End: 2024-03-01

## 2024-02-29 RX ORDER — BENZOCAINE 10 %
1 GEL (GRAM) MUCOUS MEMBRANE EVERY 6 HOURS
Refills: 0 | Status: DISCONTINUED | OUTPATIENT
Start: 2024-02-29 | End: 2024-03-01

## 2024-02-29 RX ORDER — OXYTOCIN 10 UNIT/ML
10 VIAL (ML) INJECTION ONCE
Refills: 0 | Status: DISCONTINUED | OUTPATIENT
Start: 2024-02-29 | End: 2024-03-01

## 2024-02-29 RX ORDER — OXYTOCIN 10 UNIT/ML
41.67 VIAL (ML) INJECTION
Qty: 20 | Refills: 0 | Status: DISCONTINUED | OUTPATIENT
Start: 2024-02-29 | End: 2024-03-01

## 2024-02-29 RX ORDER — MAGNESIUM HYDROXIDE 400 MG/1
30 TABLET, CHEWABLE ORAL
Refills: 0 | Status: DISCONTINUED | OUTPATIENT
Start: 2024-02-29 | End: 2024-03-01

## 2024-02-29 RX ORDER — DIPHENHYDRAMINE HCL 50 MG
25 CAPSULE ORAL EVERY 6 HOURS
Refills: 0 | Status: DISCONTINUED | OUTPATIENT
Start: 2024-02-29 | End: 2024-03-01

## 2024-02-29 RX ORDER — IBUPROFEN 200 MG
600 TABLET ORAL EVERY 6 HOURS
Refills: 0 | Status: DISCONTINUED | OUTPATIENT
Start: 2024-02-29 | End: 2024-03-01

## 2024-02-29 RX ORDER — DIBUCAINE 1 %
1 OINTMENT (GRAM) RECTAL EVERY 6 HOURS
Refills: 0 | Status: DISCONTINUED | OUTPATIENT
Start: 2024-02-29 | End: 2024-03-01

## 2024-02-29 RX ORDER — CHLORHEXIDINE GLUCONATE 213 G/1000ML
1 SOLUTION TOPICAL DAILY
Refills: 0 | Status: DISCONTINUED | OUTPATIENT
Start: 2024-02-29 | End: 2024-02-29

## 2024-02-29 RX ORDER — CITRIC ACID/SODIUM CITRATE 300-500 MG
15 SOLUTION, ORAL ORAL EVERY 6 HOURS
Refills: 0 | Status: DISCONTINUED | OUTPATIENT
Start: 2024-02-29 | End: 2024-02-29

## 2024-02-29 RX ADMIN — Medication 600 MILLIGRAM(S): at 12:28

## 2024-02-29 RX ADMIN — Medication 600 MILLIGRAM(S): at 18:03

## 2024-02-29 RX ADMIN — Medication 975 MILLIGRAM(S): at 22:02

## 2024-02-29 RX ADMIN — Medication 975 MILLIGRAM(S): at 06:47

## 2024-02-29 RX ADMIN — Medication 1 TABLET(S): at 11:53

## 2024-02-29 RX ADMIN — Medication 600 MILLIGRAM(S): at 11:53

## 2024-02-29 RX ADMIN — Medication 600 MILLIGRAM(S): at 18:35

## 2024-02-29 RX ADMIN — Medication 30 MILLIGRAM(S): at 05:23

## 2024-02-29 RX ADMIN — Medication 600 MILLIGRAM(S): at 23:57

## 2024-02-29 RX ADMIN — Medication 975 MILLIGRAM(S): at 21:02

## 2024-02-29 RX ADMIN — Medication 975 MILLIGRAM(S): at 15:30

## 2024-02-29 RX ADMIN — Medication 975 MILLIGRAM(S): at 15:00

## 2024-02-29 NOTE — OB RN PATIENT PROFILE - NS_OBGYNHISTORY_OBGYN_ALL_OB_FT
Compensated, doing well on Jardiance, Entresto, metoprolol, Aldactone  Reassess LV function next year.  ICD working properly    x1  sab x1

## 2024-02-29 NOTE — OB NEONATOLOGY/PEDIATRICIAN DELIVERY SUMMARY - NSPEDSNEONOTESA_OBGYN_ALL_OB_FT
Peds NP requested to attend delivery due to meconium fluids (arriving at 1MOL) for this 40.1wk female born on 24 at 0322 via precipitous  to a 24 y/o  blood type A+ mother.  No significant maternal or prenatal history.  PNL HIV -/Hep B-/RPR non-reactive/Rubella equivocal, GBS + on 24, no treated.  SROM at 0315 with meconium fluids.  Baby was warmed/ dried/ suctioned/ stimulated with APGARS of 9/9.  Mom plans to initiate breastfeeding, declines Hep B vaccine.  Highest maternal temp 36.6C with EOS of 0.05.  Admitted under Dr. Ibarra.

## 2024-02-29 NOTE — OB PROVIDER H&P - ASSESSMENT
A/P: 23yoF  @40.1 weeks gestation admitted for precipitous delivery.    - Admit to L&D  - Routine Labs. IVF   - GBS positive   - Routine post partum care   - D/w FLORENCE PollackC

## 2024-02-29 NOTE — OB PROVIDER DELIVERY SUMMARY - NSPROVIDERDELIVERYNOTE_OBGYN_ALL_OB_FT
Patient presented to labor and delivery fully dilated. Spontaneous vaginal delivery of liveborn infant from OA position. Head, shoulders, and body delivered easily. Infant was suctioned. Meconium stained fluid. Delayed cord clamping and infant was passed to mother. Cord clamped and cut. Placenta delivered intact with a 3 vessel cord. IV Pitocin started. Fundal massage was given and uterine fundus was found to be atonic. IM Pitocin given. Uterine fundus then found to be firm. Vaginal exam revealed an intact cervix, vaginal walls and sulci. No laceration present in perineum. Excellent hemostasis was noted. Patient was stable and went to recovery. Count was correct x 2.    Attending: Dr. Ralf Lu PGY1

## 2024-02-29 NOTE — OB RN PATIENT PROFILE - FALL HARM RISK - UNIVERSAL INTERVENTIONS
Bed in lowest position, wheels locked, appropriate side rails in place/Call bell, personal items and telephone in reach/Instruct patient to call for assistance before getting out of bed or chair/Non-slip footwear when patient is out of bed/[place] to call system/Physically safe environment - no spills, clutter or unnecessary equipment/Purposeful Proactive Rounding/Room/bathroom lighting operational, light cord in reach

## 2024-02-29 NOTE — OB PROVIDER H&P - HISTORY OF PRESENT ILLNESS
OB PA Admission H&P    23yoF  @40.1 weeks gestation (ANGEL 24) presents c/o painful contractions and rectal pressure. Pt examined and noted to be fully dilated with bulging bag, followed by precipitous vaginal delivery. GBS positive. Pt denies any other concerns.    – PNC: GBS(+). EFW 3400g.   – OBHx:   () SAB   ()  FT 6#9 uncomplicated   – GynHx: denies h/o fibroids, ovarian cysts, abnl pap smears, STDs  – PMH: denies h/o HTN, DM, asthma, thyroid disorders, bleeding disorders, h/o blood transfusions   – PSH: denies  – Psych: denies anxiety/depression/PPD    – Social: denies alcohol/tobacco/drug use in pregnancy   – Meds: PNV   – Allergies: Amoxacillin, PCN, Cephalosporins -> unknown reaction     – Will accept blood transfusions: Yes    Vital Signs Last 24 Hrs  HR: 100 (2024 03:39) (100 - 130)  BP: 127/58 (2024 03:39) (127/58 - 143/66)  SpO2: 99% (2024 03:38) (99% - 99%)    Gen: NAD  CV: RRR  Lungs: CTA b/l   Abd: gravid, non-tender  Ext: BLE non-edematous, no calf tenderness b/l     – VE: 10/100/1 bulging bag   – FHT: unable to determine 2/2 precip delivery    – EFW: 3400g   – Sono: vertex

## 2024-02-29 NOTE — OB RN DELIVERY SUMMARY - NSSELHIDDEN_OBGYN_ALL_OB_FT
[NS_DeliveryAttending1_OBGYN_ALL_OB_FT:[id number]],[NS_DeliveryAssist1_OBGYN_ALL_OB_FT:[id number]],[NS_DeliveryRN_OBGYN_ALL_OB_FT:[id number]]

## 2024-02-29 NOTE — OB PROVIDER H&P - ATTENDING COMMENTS
Agree with above  patient admitted in active labor fully dilated  GBS +   See delivery note    gchow md

## 2024-02-29 NOTE — OB RN PATIENT PROFILE - NS_ADMITDT_OBGYN_ALL_OB_DT
Hyper[facility] Wound Care   Consult received.   Patient is known to wound care team.  Patient evaluated and progress note written yesterday.   Thank you.  DEVYN Omalley   29-Feb-2024 03:10

## 2024-02-29 NOTE — OB PROVIDER DELIVERY SUMMARY - NSSELHIDDEN_OBGYN_ALL_OB_FT
[NS_DeliveryAttending1_OBGYN_ALL_OB_FT:[id number]],[NS_DeliveryAssist1_OBGYN_ALL_OB_FT:[id number]=]

## 2024-02-29 NOTE — OB RN DELIVERY SUMMARY - NS_SEPSISRSKCALC_OBGYN_ALL_OB_FT
GBS status in the 'Prenatal Lab tests/results section' on the OB RN Patient Profile must be documented.   EOS calculated successfully. EOS Risk Factor: 0.5/1000 live births ([facility] national incidence); GA=40w1d; Temp=97.9; ROM=0.117; GBS='Positive'; Antibiotics='No antibiotics or any antibiotics < 2 hrs prior to birth'

## 2024-03-01 ENCOUNTER — TRANSCRIPTION ENCOUNTER (OUTPATIENT)
Age: 24
End: 2024-03-01

## 2024-03-01 VITALS
DIASTOLIC BLOOD PRESSURE: 68 MMHG | HEART RATE: 83 BPM | TEMPERATURE: 97 F | SYSTOLIC BLOOD PRESSURE: 107 MMHG | RESPIRATION RATE: 18 BRPM | OXYGEN SATURATION: 99 %

## 2024-03-01 DIAGNOSIS — Z78.9 OTHER SPECIFIED HEALTH STATUS: ICD-10-CM

## 2024-03-01 PROCEDURE — 59050 FETAL MONITOR W/REPORT: CPT

## 2024-03-01 PROCEDURE — 90707 MMR VACCINE SC: CPT

## 2024-03-01 PROCEDURE — 86780 TREPONEMA PALLIDUM: CPT

## 2024-03-01 PROCEDURE — 86900 BLOOD TYPING SEROLOGIC ABO: CPT

## 2024-03-01 PROCEDURE — 86901 BLOOD TYPING SEROLOGIC RH(D): CPT

## 2024-03-01 PROCEDURE — 85025 COMPLETE CBC W/AUTO DIFF WBC: CPT

## 2024-03-01 PROCEDURE — 86850 RBC ANTIBODY SCREEN: CPT

## 2024-03-01 RX ADMIN — Medication 600 MILLIGRAM(S): at 00:57

## 2024-03-01 RX ADMIN — Medication 975 MILLIGRAM(S): at 15:16

## 2024-03-01 RX ADMIN — Medication 600 MILLIGRAM(S): at 05:38

## 2024-03-01 RX ADMIN — Medication 600 MILLIGRAM(S): at 13:00

## 2024-03-01 RX ADMIN — Medication 600 MILLIGRAM(S): at 06:38

## 2024-03-01 RX ADMIN — Medication 1 TABLET(S): at 12:42

## 2024-03-01 RX ADMIN — Medication 975 MILLIGRAM(S): at 02:43

## 2024-03-01 RX ADMIN — Medication 975 MILLIGRAM(S): at 10:03

## 2024-03-01 RX ADMIN — Medication 0.5 MILLILITER(S): at 15:10

## 2024-03-01 RX ADMIN — Medication 975 MILLIGRAM(S): at 11:00

## 2024-03-01 RX ADMIN — Medication 600 MILLIGRAM(S): at 12:42

## 2024-03-01 RX ADMIN — Medication 975 MILLIGRAM(S): at 03:43

## 2024-03-01 NOTE — DISCHARGE NOTE OB - CARE PROVIDER_API CALL
Tereza Arambula  Obstetrics and Gynecology  [address] [place] [address], [facility] [address]  [place] [address]  Phone: [phone]  Fax: [phone]  Follow Up Time:

## 2024-03-01 NOTE — PROGRESS NOTE ADULT - PROBLEM/PLAN-2
Patient stated he only needs to be seen once a year and he scheduled an appt for 3/5/2024.   DISPLAY PLAN FREE TEXT

## 2024-03-01 NOTE — PROGRESS NOTE ADULT - ASSESSMENT
A/P:  23y  PPD # 1   S/P  with 2nd degree laceration   Doing Well    PMHx: denies  Current Issues: denies

## 2024-03-01 NOTE — PROGRESS NOTE ADULT - PROBLEM SELECTOR PLAN 2
Increase OOB  Regular diet  PO Pain protocol  AM H&H  Routine Postpartum Care    Ghazal Vincent PA-C

## 2024-03-01 NOTE — DISCHARGE NOTE OB - PATIENT PORTAL LINK FT
You can access the FollowMyHealth Patient Portal offered by [facility] by registering at the following website: [email or web address]. By joining [employer]’s FollowMyHealth portal, you will also be able to view your health information using other applications (apps) compatible with our system.

## 2024-03-01 NOTE — DISCHARGE NOTE OB - PERSISTENT HEADACHE, BLURRED VISION
Problem: Safety Risk - Non-Violent Restraints  Goal: Patient will remain free from self-harm  INTERVENTIONS:  - Apply the least restrictive restraint to prevent harm  - Notify patient and family of reasons restraints applied  - Assess for any contributing Statement Selected

## 2024-03-01 NOTE — PROGRESS NOTE ADULT - SUBJECTIVE AND OBJECTIVE BOX
Postpartum Note- PPD#1    Prenatal Labs  Blood type: A Positive  Rubella IgG: equivocal   RPR: Negative        S:Patient w/o complaints, pain is controlled.    Pt is OOB, tolerating PO, voiding. Lochia WNL.     O:  Vital Signs Last 24 Hrs  T(C): 36.3 (01 Mar 2024 05:11), Max: 36.8 (29 Feb 2024 18:05)  T(F): 97.3 (01 Mar 2024 05:11), Max: 98.2 (29 Feb 2024 18:05)  HR: 83 (01 Mar 2024 05:11) (69 - 88)  BP: 107/68 (01 Mar 2024 05:11) (106/66 - 116/75)  BP(mean): --  RR: 18 (01 Mar 2024 05:11) (18 - 18)  SpO2: 99% (01 Mar 2024 05:11) (96% - 99%)    Parameters below as of 01 Mar 2024 05:11  Patient On (Oxygen Delivery Method): room air         Gen: NAD  Abdomen: Soft, nontender, non-distended, fundus firm.  Vaginal: Lochia WNL,    Ext:  Neg calf tenderness    LABS:    Hemoglobin: 12.4 g/dL (02-29 @ 03:59)      Hematocrit: 38.5 % (02-29 @ 03:59)